# Patient Record
Sex: FEMALE | Race: WHITE
[De-identification: names, ages, dates, MRNs, and addresses within clinical notes are randomized per-mention and may not be internally consistent; named-entity substitution may affect disease eponyms.]

---

## 2017-11-16 ENCOUNTER — HOSPITAL ENCOUNTER (EMERGENCY)
Dept: HOSPITAL 26 - MED | Age: 27
Discharge: HOME | End: 2017-11-16
Payer: MEDICAID

## 2017-11-16 VITALS — SYSTOLIC BLOOD PRESSURE: 100 MMHG | DIASTOLIC BLOOD PRESSURE: 65 MMHG

## 2017-11-16 VITALS — DIASTOLIC BLOOD PRESSURE: 64 MMHG | SYSTOLIC BLOOD PRESSURE: 108 MMHG

## 2017-11-16 VITALS — BODY MASS INDEX: 25.87 KG/M2 | WEIGHT: 146 LBS | HEIGHT: 63 IN

## 2017-11-16 VITALS — DIASTOLIC BLOOD PRESSURE: 69 MMHG | SYSTOLIC BLOOD PRESSURE: 108 MMHG

## 2017-11-16 DIAGNOSIS — N39.0: Primary | ICD-10-CM

## 2017-11-16 LAB
APPEARANCE UR: (no result)
BILIRUB UR QL STRIP: NEGATIVE
COLOR UR: YELLOW
GLUCOSE UR STRIP-MCNC: NEGATIVE MG/DL
HGB UR QL STRIP: (no result)
LEUKOCYTE ESTERASE UR QL STRIP: (no result)
NITRITE UR QL STRIP: POSITIVE
PH UR STRIP: 7 [PH] (ref 5–9)
RBC #/AREA URNS HPF: (no result) /HPF (ref 0–5)

## 2017-11-16 NOTE — NUR
Patient discharged with v/s stable. Written and verbal after care instructions 
given and explained. 

Patient alert, oriented and verbalized understanding of instructions. 
Ambulatory with steady gait. All questions addressed prior to discharge. ID 
band removed. Patient advised to follow up with PMD. Rx of AUGMENTIN given. 
Patient educated on indication of medication including possible reaction and 
side effects. Opportunity to ask questions provided and answered.

## 2017-11-16 NOTE — NUR
PT SENT TO ER FROM L&D FOR EVALUATION OF LEFT SIDED LBP X2 DAYS. PT 28 WEEKS 
PREGNANT, LMP 4/25/17, EDC 1/30/18. HX ASTHMA.

## 2018-07-17 ENCOUNTER — HOSPITAL ENCOUNTER (EMERGENCY)
Dept: HOSPITAL 26 - MED | Age: 28
Discharge: LEFT BEFORE DISCHARGE | End: 2018-07-17
Payer: MEDICAID

## 2018-07-17 DIAGNOSIS — Z53.21: Primary | ICD-10-CM

## 2019-06-06 ENCOUNTER — HOSPITAL ENCOUNTER (EMERGENCY)
Dept: HOSPITAL 1 - ED | Age: 29
Discharge: HOME | End: 2019-06-06
Payer: COMMERCIAL

## 2019-06-06 VITALS
WEIGHT: 141 LBS | HEIGHT: 65 IN | HEIGHT: 65 IN | BODY MASS INDEX: 23.49 KG/M2 | WEIGHT: 141 LBS | BODY MASS INDEX: 23.49 KG/M2

## 2019-06-06 VITALS — DIASTOLIC BLOOD PRESSURE: 77 MMHG | SYSTOLIC BLOOD PRESSURE: 113 MMHG

## 2019-06-06 DIAGNOSIS — J45.909: ICD-10-CM

## 2019-06-06 DIAGNOSIS — B86: Primary | ICD-10-CM
